# Patient Record
Sex: MALE | Race: WHITE | NOT HISPANIC OR LATINO | URBAN - METROPOLITAN AREA
[De-identification: names, ages, dates, MRNs, and addresses within clinical notes are randomized per-mention and may not be internally consistent; named-entity substitution may affect disease eponyms.]

---

## 2017-04-05 ENCOUNTER — HOSPITAL ENCOUNTER (OUTPATIENT)
Dept: RADIOLOGY | Facility: OTHER | Age: 27
Discharge: HOME | End: 2017-04-05
Attending: FAMILY MEDICINE
Payer: COMMERCIAL

## 2017-04-05 DIAGNOSIS — N23 BILATERAL RENAL COLIC: ICD-10-CM

## 2017-04-05 PROCEDURE — 74176 CT ABD & PELVIS W/O CONTRAST: CPT | Mod: 26,,, | Performed by: INTERNAL MEDICINE

## 2017-04-05 PROCEDURE — 74176 CT ABD & PELVIS W/O CONTRAST: CPT | Mod: TC

## 2017-04-06 ENCOUNTER — HOSPITAL ENCOUNTER (EMERGENCY)
Facility: OTHER | Age: 27
Discharge: HOME | End: 2017-04-06
Attending: EMERGENCY MEDICINE
Payer: COMMERCIAL

## 2017-04-06 VITALS
TEMPERATURE: 98 F | RESPIRATION RATE: 14 BRPM | HEIGHT: 75 IN | OXYGEN SATURATION: 100 % | SYSTOLIC BLOOD PRESSURE: 131 MMHG | WEIGHT: 180 LBS | BODY MASS INDEX: 22.38 KG/M2 | DIASTOLIC BLOOD PRESSURE: 85 MMHG | HEART RATE: 67 BPM

## 2017-04-06 DIAGNOSIS — Z87.19 HISTORY OF DIVERTICULOSIS: ICD-10-CM

## 2017-04-06 DIAGNOSIS — Z87.898 HISTORY OF HEPATOMEGALY: Primary | ICD-10-CM

## 2017-04-06 LAB
ALBUMIN SERPL BCP-MCNC: 4.7 G/DL
ALP SERPL-CCNC: 59 U/L
ALT SERPL W/O P-5'-P-CCNC: 35 U/L
ANION GAP SERPL CALC-SCNC: 9 MMOL/L
AST SERPL-CCNC: 22 U/L
BASOPHILS # BLD AUTO: 0.01 K/UL
BASOPHILS NFR BLD: 0.1 %
BILIRUB SERPL-MCNC: 0.4 MG/DL
BILIRUB UR QL STRIP: NEGATIVE
BUN SERPL-MCNC: 10 MG/DL
CALCIUM SERPL-MCNC: 9.5 MG/DL
CHLORIDE SERPL-SCNC: 104 MMOL/L
CLARITY UR: CLEAR
CO2 SERPL-SCNC: 29 MMOL/L
COLOR UR: YELLOW
CREAT SERPL-MCNC: 0.9 MG/DL
DIFFERENTIAL METHOD: ABNORMAL
EOSINOPHIL # BLD AUTO: 0.2 K/UL
EOSINOPHIL NFR BLD: 2.9 %
ERYTHROCYTE [DISTWIDTH] IN BLOOD BY AUTOMATED COUNT: 13.3 %
EST. GFR  (AFRICAN AMERICAN): >60 ML/MIN/1.73 M^2
EST. GFR  (NON AFRICAN AMERICAN): >60 ML/MIN/1.73 M^2
GLUCOSE SERPL-MCNC: 97 MG/DL
GLUCOSE UR QL STRIP: NEGATIVE
HCT VFR BLD AUTO: 47.2 %
HGB BLD-MCNC: 15.3 G/DL
HGB UR QL STRIP: NEGATIVE
KETONES UR QL STRIP: NEGATIVE
LEUKOCYTE ESTERASE UR QL STRIP: NEGATIVE
LIPASE SERPL-CCNC: 8 U/L
LYMPHOCYTES # BLD AUTO: 2.1 K/UL
LYMPHOCYTES NFR BLD: 24.8 %
MCH RBC QN AUTO: 26.7 PG
MCHC RBC AUTO-ENTMCNC: 32.4 %
MCV RBC AUTO: 82 FL
MONOCYTES # BLD AUTO: 0.4 K/UL
MONOCYTES NFR BLD: 5.1 %
NEUTROPHILS # BLD AUTO: 5.5 K/UL
NEUTROPHILS NFR BLD: 66.7 %
NITRITE UR QL STRIP: NEGATIVE
PH UR STRIP: 8.5 [PH] (ref 5–8)
PLATELET # BLD AUTO: 195 K/UL
PMV BLD AUTO: 11.4 FL
POTASSIUM SERPL-SCNC: 4.3 MMOL/L
PROT SERPL-MCNC: 7.9 G/DL
PROT UR QL STRIP: NEGATIVE
RBC # BLD AUTO: 5.73 M/UL
SODIUM SERPL-SCNC: 142 MMOL/L
SP GR UR STRIP: 1.01 (ref 1–1.03)
URN SPEC COLLECT METH UR: NORMAL
UROBILINOGEN UR STRIP-ACNC: NEGATIVE EU/DL
WBC # BLD AUTO: 8.29 K/UL

## 2017-04-06 PROCEDURE — 80053 COMPREHEN METABOLIC PANEL: CPT

## 2017-04-06 PROCEDURE — 99283 EMERGENCY DEPT VISIT LOW MDM: CPT

## 2017-04-06 PROCEDURE — 85025 COMPLETE CBC W/AUTO DIFF WBC: CPT

## 2017-04-06 PROCEDURE — 81003 URINALYSIS AUTO W/O SCOPE: CPT

## 2017-04-06 PROCEDURE — 83690 ASSAY OF LIPASE: CPT

## 2017-04-06 PROCEDURE — 25000003 PHARM REV CODE 250: Performed by: PHYSICIAN ASSISTANT

## 2017-04-06 RX ORDER — NAPROXEN 500 MG/1
500 TABLET ORAL
Status: COMPLETED | OUTPATIENT
Start: 2017-04-06 | End: 2017-04-06

## 2017-04-06 RX ORDER — CIPROFLOXACIN 250 MG/1
250 TABLET, FILM COATED ORAL
COMMUNITY

## 2017-04-06 RX ADMIN — NAPROXEN 500 MG: 500 TABLET ORAL at 03:04

## 2017-04-06 NOTE — ED PROVIDER NOTES
"Encounter Date: 4/6/2017       History     Chief Complaint   Patient presents with    Other      pt wants to be checked out for Vibrio, pt is  and works with raw oysters, told yesterday he had enlarged liver     Review of patient's allergies indicates:  No Known Allergies  HPI Comments: Patient is a 26 y.o. male presenting to the emergency department for evaluation of enlarged liver.  The patient reports that over the past few weeks he has had increasing fatigue and what he believes his kidney pain.  He states that he went to an urgent care yesterday for evaluation and they ordered a CT scan.  He states that the doctor from the urgent care told him that he had an "extremely large liver" and he is concerned that he may have a vibrio infection.  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 He states that a few months ago he had a cut to his finger from an oyster knife that required antibiotic treatment. He denies any abdominal pain, nausea, vomiting, fever, chills.  He states that the urgent care also told him he had an infection of his colon, and he was given 2 antibiotics.  He states that he is on Cipro, as is unsure of the other one. He denies heavy alcohol use.    The history is provided by the patient.     History reviewed. No pertinent past medical history.  Past Surgical History:   Procedure Laterality Date    NOSE SURGERY       History reviewed. No pertinent family history.  Social History   Substance Use Topics    Smoking status: Never Smoker    Smokeless tobacco: None    Alcohol use 1.2 oz/week     2 Shots of liquor per week     Review of Systems   Constitutional: Positive for fatigue. Negative for activity change, chills and fever.   HENT: Negative for congestion, rhinorrhea and sore throat.    Eyes: Negative for photophobia and visual disturbance.   Respiratory: Negative for cough and shortness of breath.    Cardiovascular: Negative for chest pain.   Gastrointestinal: Negative " for abdominal pain, diarrhea, nausea and vomiting.   Genitourinary: Positive for flank pain. Negative for dysuria, hematuria and urgency.   Musculoskeletal: Positive for back pain and myalgias. Negative for neck pain.   Skin: Negative for color change and wound.   Neurological: Negative for weakness and headaches.   Psychiatric/Behavioral: Negative for agitation and confusion.       Physical Exam   Initial Vitals   BP Pulse Resp Temp SpO2   04/06/17 1241 04/06/17 1241 04/06/17 1241 04/06/17 1241 04/06/17 1241   134/76 77 18 97.7 °F (36.5 °C) 99 %     Physical Exam    Nursing note and vitals reviewed.  Constitutional: Vital signs are normal. He appears well-developed and well-nourished. He is not diaphoretic.  Non-toxic appearance. He does not have a sickly appearance. No distress.   Well appearing,  male unaccompanied in the emergency department.  He is speaking in clear and full sentences, moving all extremities.  He is in no acute distress.   HENT:   Head: Normocephalic and atraumatic.   Right Ear: External ear normal.   Left Ear: External ear normal.   Nose: Nose normal.   Mouth/Throat: Oropharynx is clear and moist.   Eyes: Conjunctivae and EOM are normal.   Neck: Normal range of motion. Neck supple.   Cardiovascular: Normal rate, regular rhythm and normal heart sounds.   Pulmonary/Chest: Breath sounds normal. No respiratory distress. He has no wheezes.   Abdominal: Soft. Bowel sounds are normal. He exhibits no distension. There is no tenderness. There is no rebound, no guarding and no CVA tenderness.   Abdomen is soft and nontender with normal bowel sounds.  No rebound, guarding, mass.  No CVA tenderness.     Musculoskeletal: Normal range of motion.   Neurological: He is alert and oriented to person, place, and time. GCS eye subscore is 4. GCS verbal subscore is 5. GCS motor subscore is 6.   Skin: Skin is warm.   Psychiatric: He has a normal mood and affect. His behavior is normal. Judgment and thought  content normal.         ED Course   Procedures  Labs Reviewed   CBC W/ AUTO DIFFERENTIAL - Abnormal; Notable for the following:        Result Value    MCH 26.7 (*)     All other components within normal limits   COMPREHENSIVE METABOLIC PANEL   LIPASE   URINALYSIS             Medical Decision Making:   History:   Old Medical Records: I decided to obtain old medical records.  Old Records Summarized: other records.       <> Summary of Records: Reviewed medical record in Epic including CT scan from yesterday that showed mild hepatomegaly in addition to diverticulosis with possible diverticulitis.  Clinical Tests:   Lab Tests: Ordered and Reviewed  The following lab test(s) were unremarkable: CBC, CMP, Lipase and Urinalysis  Other:   I have discussed this case with another health care provider.       <> Summary of the Discussion: Dr. Ayala  This note was created using Dragon Medical Dictation. There may be typographical errors secondary to dictation.     Urgent evaluation of a 26 y.o. male presenting to the emergency department complaining of abdominal pain and abnormal CT finding. Patient is afebrile, nontoxic appearing and hemodynamically stable.  Physical exam reveals regular rate and rhythm, lungs are clear to auscultation bilaterally, abdomen is soft and nontender with normal bowel sounds.  I did review the patient's medical record including CT scan that showed mild hepatosplenomegaly.  Will plan to obtain blood work including CBC, CMP, lipase, UA and reassess.  UA shows no leukocytosis, normal H&H. CBC shows no leukocytosis, normal H&H.  CMP shows no acute electrolyte abnormality, no elevation of LFTs.  Lipase is normal.  At this time, I do not feel that further testing or imaging is warranted.  The patient was counseled on symptomatic care and treatment.  He states discharge home in stable condition. The patient was instructed to follow up with a primary care provider in 2 days or to return to the emergency  department for worsening symptoms. The treatment plan was discussed with the patient who demonstrated understanding and comfort with plan. The patient's history, physical exam, and plan of care was discussed with and agreed upon with my supervising physician.     History reviewed. No pertinent past medical history.                  ED Course     Clinical Impression:     1. History of hepatomegaly    2. History of diverticulosis       Disposition:   Disposition: Discharged  Condition: Stable       Kaye Napier PA-C  04/06/17 1546

## 2017-04-06 NOTE — ED NOTES
Pt rounding complete.  Back pain 10/10, PA informed pt requesting medication.  Restroom and comfort needs addressed.  Pt updated on plan of care.  Will continue to monitor.

## 2017-04-06 NOTE — ED NOTES
"Two patient identifiers have been checked and are correct.      Appearance: Pt awake, alert & oriented to person, place & time. Pt in no acute distress at present time. Pt is clean and well groomed with clothes appropriately fastened.   Skin: Skin warm, dry & intact. Color consistent with ethnicity. Mucous membranes moist. No breakdown or brusing noted. Abrasion noted to left thumb no swelling, redness or warmth noted to site.   Musculoskeletal: Patient moving all extremities well, no obvious swelling or deformities noted.   Respiratory: Respirations spontaneous, even, and non-labored. Visible chest rise noted. Airway is open and patent. No accessory muscle use noted.   Neurologic: Sensation is intact. Speech is clear and appropriate. Eyes open spontaneously, behavior appropriate to situation, follows commands, facial expression symmetrical, bilateral hand grasp equal and even, purposeful motor response noted.  Cardiac: All peripheral pulses present. No Bilateral lower extremity edema. Cap refill is <3 seconds.  Abdomen: Abdomen soft, non-tender to palpation. + bilateral intermittent lower "stabbing" back pain 8/10 on pain scale.   : Pt reports no dysuria or hematuria.           "

## 2017-04-06 NOTE — PROVIDER PROGRESS NOTES - EMERGENCY DEPT.
Encounter Date: 4/6/2017    ED Physician Progress Notes        Physician Note:   I evaluated the patient in triage and performed medical screening exam. Patient stable at this time and awaiting bed. Chief complaint and vital signs reviewed.

## 2017-04-06 NOTE — ED AVS SNAPSHOT
[facility]  [address]  [facility] [address]               Haja Kapoor   2017  1:21 PM   ED    Description:  Male : 1990   Department:  [facility]           Your Care was Coordinated By:     Provider Role From To    Roshan Ayala II, MD Attending Provider 17 1421 17 1430    Kaye Npaier PA-C Physician Assistant 17 1321 17 1330    Kaye Napier PA-C Physician Assistant 17 1421 17 1430    Kaye Napier PA-C Physician Assistant 17 1505 --      Reason for Visit     Other           Diagnoses this Visit        Comments    History of hepatomegaly    -  Primary     History of diverticulosis           ED Disposition     None           To Do List           Follow-up Information     Follow up with Lisandro Sidhu MD In 2 days.    Specialty:  Family Medicine    Contact information:    [address]  [facility] [address]  [facility] [address]  [phone]          Follow up with [facility].    Specialty:  Emergency Medicine    Why:  If symptoms worsen    Contact information:    [address]  [facility] [id number] [phone]      [facility]     [facility] Nurse Care Line - 24/7 Assistance  Unless otherwise directed by your provider, please contact [facility], our nurse care line that is available for 24/7 assistance.     Registered nurses in the [facility] provide: appointment scheduling, clinical advisement, health education, and other advisory services.  Call: [phone] (toll free)               Medications           Message regarding Medications     Verify the changes and/or additions to your medication regime listed below are the same as discussed with your clinician today.  If any of these changes or additions are incorrect, please notify your healthcare provider.        These medications were administered today        Dose  "Freq    naproxen tablet 500 mg 500 mg ED 1 Time    Sig: Take 1 tablet (500 mg total) by mouth ED 1 Time.    Class: Normal    Route: Oral           Verify that the below list of medications is an accurate representation of the medications you are currently taking.  If none reported, the list may be blank. If incorrect, please contact your healthcare provider. Carry this list with you in case of emergency.           Current Medications     ciprofloxacin HCl (CIPRO) 250 MG tablet Take 250 mg by mouth every 12 (twelve) hours.    naproxen tablet 500 mg Take 1 tablet (500 mg total) by mouth ED 1 Time.           Clinical Reference Information           Your Vitals Were     BP Pulse Temp Resp Height Weight    134/76 (BP Location: Left arm, Patient Position: Sitting) 77 97.7 °F (36.5 °C) (Oral) 18 6' 3" (1.905 m) 81.6 kg (180 lb)    SpO2 BMI             99% 22.5 kg/m2         Allergies as of 4/6/2017     No Known Allergies      Immunizations Administered on Date of Encounter - 4/6/2017     None      ED Micro, Lab, POCT     Start Ordered       Status Ordering Provider    04/06/17 1431 04/06/17 1430  Urinalysis  STAT      Final result     04/06/17 1331 04/06/17 1330  CBC auto differential  STAT      Final result     04/06/17 1331 04/06/17 1330  Comprehensive metabolic panel  STAT      Final result     04/06/17 1331 04/06/17 1330  Lipase  STAT      Final result       ED Imaging Orders     None      Discharge References/Attachments     DIVERTICULOSIS AND DIVERTICULITIS, UNDERSTANDING (ENGLISH)      [facility] Sign-Up     Activating your [facility] account is as easy as 1-2-3!     1) Visit [email or web address], select Sign Up Now, enter this activation code and your date of birth, then select Next.  [email or web address]  Expires: 5/21/2017  3:40 PM      2) Create a username and password to use when you visit [facility] in the future and select a security question in case you lose your password and select Next.    3) Enter your e-mail address " and click Sign Up!    Additional Information  If you have questions, please e-mail [email or web address] or call [phone] to talk to our [facility] staff. Remember, [facility] is NOT to be used for urgent needs. For medical emergencies, dial 911.          [facility] complies with applicable Federal civil rights laws and does not discriminate on the basis of race, color, national origin, age, disability, or sex.        Language Assistance Services     ATTENTION: Language assistance services are available, free of charge. Please call [phone].      ATENCIÓN: Si habla español, tiene a haddad disposición servicios gratuitos de asistencia lingüística. Llame al [phone].     CHÚ Ý: N?u b?n nói Ti?ng Vi?t, có các d?ch v? h? tr? ngôn ng? mi?n phí dành cho b?n. G?i s? [phone].

## 2019-09-17 ENCOUNTER — OFFICE VISIT (OUTPATIENT)
Dept: URGENT CARE | Facility: CLINIC | Age: 29
End: 2019-09-17
Payer: COMMERCIAL

## 2019-09-17 VITALS
SYSTOLIC BLOOD PRESSURE: 127 MMHG | HEIGHT: 75 IN | WEIGHT: 200 LBS | OXYGEN SATURATION: 99 % | TEMPERATURE: 98 F | DIASTOLIC BLOOD PRESSURE: 84 MMHG | HEART RATE: 78 BPM | RESPIRATION RATE: 15 BRPM | BODY MASS INDEX: 24.87 KG/M2

## 2019-09-17 DIAGNOSIS — Z87.19 HISTORY OF DIVERTICULOSIS: ICD-10-CM

## 2019-09-17 DIAGNOSIS — R10.9 LEFT FLANK PAIN: ICD-10-CM

## 2019-09-17 DIAGNOSIS — K59.00 CONSTIPATION, UNSPECIFIED CONSTIPATION TYPE: Primary | ICD-10-CM

## 2019-09-17 LAB
BILIRUB UR QL STRIP: NEGATIVE
GLUCOSE UR QL STRIP: NEGATIVE
KETONES UR QL STRIP: NEGATIVE
LEUKOCYTE ESTERASE UR QL STRIP: NEGATIVE
PH, POC UA: 5.5 (ref 5–8)
POC BLOOD, URINE: NEGATIVE
POC NITRATES, URINE: NEGATIVE
PROT UR QL STRIP: NEGATIVE
SP GR UR STRIP: 1.02 (ref 1–1.03)
UROBILINOGEN UR STRIP-ACNC: NEGATIVE (ref 0.3–2.2)

## 2019-09-17 PROCEDURE — 99203 OFFICE O/P NEW LOW 30 MIN: CPT | Mod: S$GLB,,, | Performed by: NURSE PRACTITIONER

## 2019-09-17 PROCEDURE — 3008F BODY MASS INDEX DOCD: CPT | Mod: CPTII,S$GLB,, | Performed by: NURSE PRACTITIONER

## 2019-09-17 PROCEDURE — 3008F PR BODY MASS INDEX (BMI) DOCUMENTED: ICD-10-PCS | Mod: CPTII,S$GLB,, | Performed by: NURSE PRACTITIONER

## 2019-09-17 PROCEDURE — 99203 PR OFFICE/OUTPT VISIT, NEW, LEVL III, 30-44 MIN: ICD-10-PCS | Mod: S$GLB,,, | Performed by: NURSE PRACTITIONER

## 2019-09-17 PROCEDURE — 74019 RADEX ABDOMEN 2 VIEWS: CPT | Mod: FY,S$GLB,, | Performed by: RADIOLOGY

## 2019-09-17 PROCEDURE — 74019 XR ABDOMEN FLAT AND ERECT: ICD-10-PCS | Mod: FY,S$GLB,, | Performed by: RADIOLOGY

## 2019-09-17 PROCEDURE — 81003 POCT URINALYSIS, DIPSTICK, AUTOMATED, W/O SCOPE: ICD-10-PCS | Mod: QW,S$GLB,, | Performed by: NURSE PRACTITIONER

## 2019-09-17 PROCEDURE — 81003 URINALYSIS AUTO W/O SCOPE: CPT | Mod: QW,S$GLB,, | Performed by: NURSE PRACTITIONER

## 2019-09-17 NOTE — PROGRESS NOTES
"Subjective:       Patient ID: Haja Kapoor is a 29 y.o. male.    Vitals:  height is 6' 3" (1.905 m) and weight is 90.7 kg (200 lb). His temperature is 97.6 °F (36.4 °C). His blood pressure is 127/84 and his pulse is 78. His respiration is 15 and oxygen saturation is 99%.     Chief Complaint: Flank Pain (left side)    Patient [place], woke up from left side pain this morning. He states to have had similar feeling 3-4 years ago and recalls them telling him to stay away from rice and strawberries. Patient state to have had rice last before pain started. Patient hasn't taken anything for this problem.    Provider note begins below:    Patient is a well nourished, healthy appearing [place] male.  Colicky left flank pain since this morning.  Pain occurs with deep breathing and certain movements, but is fine staying still. Only change he can think of is dietary (as above). Denies fever or chills. No sharp abdominal pain. No N/V/D.     Patient denies both diarrhea and constipation. States typical 1-2 BM per day.    States has Hx of diverticular disease and is concerned for acute diverticulitis/diverticulosis.    Flank Pain   This is a new problem. The current episode started today. The problem occurs intermittently. The pain is at a severity of 8/10. The symptoms are aggravated by bending and sitting. Associated symptoms include abdominal pain. Pertinent negatives include no bladder incontinence, bowel incontinence, chest pain, dysuria, fever, numbness or pelvic pain. He has tried nothing for the symptoms.       Constitution: Negative for appetite change, chills, sweating, fatigue and fever.   Cardiovascular: Negative for chest pain.   Respiratory: Negative for shortness of breath.    Gastrointestinal: Positive for abdominal pain. Negative for abdominal trauma, abdominal bloating, history of abdominal surgery, nausea, vomiting, constipation, diarrhea, dark colored stools, heartburn and bowel incontinence. "   Genitourinary: Positive for flank pain. Negative for dysuria, urgency, bladder incontinence, hematuria and pelvic pain.   Musculoskeletal: Negative for back pain, muscle cramps and history of spine disorder.   Skin: Negative for rash.   Neurological: Negative for coordination disturbances, numbness and tingling.       Objective:      Physical Exam   Constitutional: He is oriented to person, place, and time. He appears well-developed and well-nourished.   HENT:   Head: Normocephalic and atraumatic.   Right Ear: External ear normal.   Left Ear: External ear normal.   Nose: Nose normal.   Mouth/Throat: Mucous membranes are normal.   Eyes: Conjunctivae and lids are normal.   Neck: Trachea normal and full passive range of motion without pain. Neck supple.   Cardiovascular: Normal rate, regular rhythm, normal heart sounds and intact distal pulses.   Pulmonary/Chest: Effort normal and breath sounds normal. No stridor. No respiratory distress. He has no wheezes.   Abdominal: Soft. Normal appearance and bowel sounds are normal. He exhibits no distension, no abdominal bruit, no pulsatile midline mass and no mass. There is no hepatosplenomegaly. There is no tenderness. There is no rigidity, no rebound, no guarding and no CVA tenderness.   Musculoskeletal: Normal range of motion. He exhibits no edema.        Lumbar back: He exhibits tenderness.        Back:    Neurological: He is alert and oriented to person, place, and time. He has normal strength.   Skin: Skin is warm, dry and intact. He is not diaphoretic. No pallor.   Psychiatric: He has a normal mood and affect. His speech is normal and behavior is normal. Judgment and thought content normal. Cognition and memory are normal.   Nursing note and vitals reviewed.      X-ray Abdomen Flat And Erect    Result Date: 9/17/2019  EXAMINATION: XR ABDOMEN FLAT AND ERECT CLINICAL HISTORY: Unspecified abdominal pain TECHNIQUE: Flat and erect AP views of the abdomen were performed.  COMPARISON: CT abdomen and pelvis dated 04/05/2017. FINDINGS: The lung bases are unremarkable. There is no evidence of free air beneath the hemidiaphragms. The stomach is nondistended.  There is paucity of bowel gas. There is large colonic stool burden.  There is no evidence of pneumatosis.  No portal venous air is identified. The osseous structures are unremarkable. No abnormal calcifications are present.     Nonobstructive bowel gas pattern. Large colonic stool burden.  The findings may be seen with constipation. Electronically signed by: Salomón Farmer MD Date:    09/17/2019 Time:    16:41    Assessment:       1. Constipation, unspecified constipation type    2. Left flank pain    3. History of diverticulosis        Plan:         Constipation, unspecified constipation type  -     Ambulatory Referral to Family Practice    Left flank pain  -     X-Ray Abdomen Flat And Erect; Future; Expected date: 09/17/2019  -     POCT Urinalysis, Dipstick, Automated, W/O Scope  -     Ambulatory Referral to Family Practice    History of diverticulosis  -     Ambulatory Referral to Family Practice      Patient Instructions     Please start taking Miralax daily until you are having 2-3 soft bowel movements per day.    If your pain worsens, becomes sharp, is associated with fever or weakness, please go immediately to the Emergency Room as we discussed.    Constipation (Adult)  Constipation means that you have bowel movements that are less frequent than usual. Stools often become very hard and difficult to pass.  Constipation is very common. At some point in life it affects almost everyone. Since everyone's bowel habits are different, what is constipation to one person may not be to another. Your healthcare provider may do tests to diagnose constipation. It depends on what he or she finds when evaluating you.    Symptoms of constipation include:  · Abdominal pain  · Bloating  · Vomiting  · Painful bowel movements  · Itching, swelling,  bleeding, or pain around the anus  Causes  Constipation can have many causes. These include:  · Diet low in fiber  · Too much dairy  · Not drinking enough liquids  · Lack of exercise or physical activity. This is especially true for older adults.  · Changes in lifestyle or daily routine, including pregnancy, aging, work, and travel  · Frequent use or misuse of laxatives  · Ignoring the urge to have a bowel movement or delaying it until later  · Medicines, such as certain prescription pain medicines, iron supplements, antacids, certain antidepressants, and calcium supplements  · Diseases like irritable bowel syndrome, bowel obstructions, stroke, diabetes, thyroid disease, Parkinson disease, hemorrhoids, and colon cancer  Complications  Potential complications of constipation can include:  · Hemorrhoids  · Rectal bleeding from hemorrhoids or anal fissures (skin tears)  · Hernias  · Dependency on laxatives  · Chronic constipation  · Fecal impaction  · Bowel obstruction or perforation  Home care  All treatment should be done after talking with your healthcare provider. This is especially true if you have another medical problems, are taking prescription medicines, or are an older adult. Treatment most often involves lifestyle changes. You may also need medicines. Your healthcare provider will tell you which will work best for you. Follow the advice below to help avoid this problem in the future.  Lifestyle changes  These lifestyle changes can help prevent constipation:  · Diet. Eat a high-fiber diet, with fresh fruit and vegetables, and reduce dairy intake, meats, and processed foods  · Fluids. It's important to get enough fluids each day. Drink plenty of water when you eat more fiber. If you are on diet that limits the amount of fluid you can have, talk about this with your healthcare provider.  · Regular exercise. Check with your healthcare provider first.  Medications  Take any medicines as directed. Some laxatives  are safe to use only every now and then. Others can be taken on a regular basis. Talk with your doctor or pharmacist if you have questions.  Prescription pain medicines can cause constipation. If you are taking this kind of medicine, ask your healthcare provider if you should also take a stool softener.  Medicines you may take to treat constipation include:  · Fiber supplements  · Stool softeners  · Laxatives  · Enemas  · Rectal suppositories  Follow-up care  Follow up with your healthcare provider if symptoms don't get better in the next few days. You may need to have more tests or see a specialist.  Call 911  Call 911 if any of these occur:  · Trouble breathing  · Stiff, rigid abdomen that is severely painful to touch  · Confusion  · Fainting or loss of consciousness  · Rapid heart rate  · Chest pain  When to seek medical advice  Call your healthcare provider right away if any of these occur:  · Fever over 100.4°F (38°C)  · Failure to resume normal bowel movements  · Pain in your abdomen or back gets worse  · Nausea or vomiting  · Swelling in your abdomen  · Blood in the stool  · Black, tarry stool  · Involuntary weight loss  · Weakness  Date Last Reviewed: 12/30/2015  © 3812-7177 [employer] [address] [place] [address], [place] [address]. All rights reserved. This information is not intended as a substitute for professional medical care. Always follow your healthcare professional's instructions.

## 2019-09-17 NOTE — PATIENT INSTRUCTIONS
Please start taking Miralax daily until you are having 2-3 soft bowel movements per day.    If your pain worsens, becomes sharp, is associated with fever or weakness, please go immediately to the Emergency Room as we discussed.    Constipation (Adult)  Constipation means that you have bowel movements that are less frequent than usual. Stools often become very hard and difficult to pass.  Constipation is very common. At some point in life it affects almost everyone. Since everyone's bowel habits are different, what is constipation to one person may not be to another. Your healthcare provider may do tests to diagnose constipation. It depends on what he or she finds when evaluating you.    Symptoms of constipation include:  · Abdominal pain  · Bloating  · Vomiting  · Painful bowel movements  · Itching, swelling, bleeding, or pain around the anus  Causes  Constipation can have many causes. These include:  · Diet low in fiber  · Too much dairy  · Not drinking enough liquids  · Lack of exercise or physical activity. This is especially true for older adults.  · Changes in lifestyle or daily routine, including pregnancy, aging, work, and travel  · Frequent use or misuse of laxatives  · Ignoring the urge to have a bowel movement or delaying it until later  · Medicines, such as certain prescription pain medicines, iron supplements, antacids, certain antidepressants, and calcium supplements  · Diseases like irritable bowel syndrome, bowel obstructions, stroke, diabetes, thyroid disease, Parkinson disease, hemorrhoids, and colon cancer  Complications  Potential complications of constipation can include:  · Hemorrhoids  · Rectal bleeding from hemorrhoids or anal fissures (skin tears)  · Hernias  · Dependency on laxatives  · Chronic constipation  · Fecal impaction  · Bowel obstruction or perforation  Home care  All treatment should be done after talking with your healthcare provider. This is especially true if you have another  medical problems, are taking prescription medicines, or are an older adult. Treatment most often involves lifestyle changes. You may also need medicines. Your healthcare provider will tell you which will work best for you. Follow the advice below to help avoid this problem in the future.  Lifestyle changes  These lifestyle changes can help prevent constipation:  · Diet. Eat a high-fiber diet, with fresh fruit and vegetables, and reduce dairy intake, meats, and processed foods  · Fluids. It's important to get enough fluids each day. Drink plenty of water when you eat more fiber. If you are on diet that limits the amount of fluid you can have, talk about this with your healthcare provider.  · Regular exercise. Check with your healthcare provider first.  Medications  Take any medicines as directed. Some laxatives are safe to use only every now and then. Others can be taken on a regular basis. Talk with your doctor or pharmacist if you have questions.  Prescription pain medicines can cause constipation. If you are taking this kind of medicine, ask your healthcare provider if you should also take a stool softener.  Medicines you may take to treat constipation include:  · Fiber supplements  · Stool softeners  · Laxatives  · Enemas  · Rectal suppositories  Follow-up care  Follow up with your healthcare provider if symptoms don't get better in the next few days. You may need to have more tests or see a specialist.  Call 911  Call 911 if any of these occur:  · Trouble breathing  · Stiff, rigid abdomen that is severely painful to touch  · Confusion  · Fainting or loss of consciousness  · Rapid heart rate  · Chest pain  When to seek medical advice  Call your healthcare provider right away if any of these occur:  · Fever over 100.4°F (38°C)  · Failure to resume normal bowel movements  · Pain in your abdomen or back gets worse  · Nausea or vomiting  · Swelling in your abdomen  · Blood in the stool  · Black, tarry  stool  · Involuntary weight loss  · Weakness  Date Last Reviewed: 12/30/2015  © 3135-1388 [employer], [employer]. [address] [place] [address], [place] [address]. All rights reserved. This information is not intended as a substitute for professional medical care. Always follow your healthcare professional's instructions.

## 2019-09-20 ENCOUNTER — TELEPHONE (OUTPATIENT)
Dept: URGENT CARE | Facility: CLINIC | Age: 29
End: 2019-09-20